# Patient Record
Sex: FEMALE | Race: WHITE | NOT HISPANIC OR LATINO | URBAN - METROPOLITAN AREA
[De-identification: names, ages, dates, MRNs, and addresses within clinical notes are randomized per-mention and may not be internally consistent; named-entity substitution may affect disease eponyms.]

---

## 2018-01-16 ENCOUNTER — APPOINTMENT (RX ONLY)
Dept: URBAN - METROPOLITAN AREA CLINIC 38 | Facility: CLINIC | Age: 33
Setting detail: DERMATOLOGY
End: 2018-01-16

## 2018-01-16 DIAGNOSIS — D22 MELANOCYTIC NEVI: ICD-10-CM

## 2018-01-16 DIAGNOSIS — L82.1 OTHER SEBORRHEIC KERATOSIS: ICD-10-CM

## 2018-01-16 PROBLEM — D22.5 MELANOCYTIC NEVI OF TRUNK: Status: ACTIVE | Noted: 2018-01-16

## 2018-01-16 PROCEDURE — ? OBSERVATION AND MEASURE

## 2018-01-16 PROCEDURE — 99213 OFFICE O/P EST LOW 20 MIN: CPT

## 2018-01-16 PROCEDURE — ? COUNSELING

## 2018-01-16 ASSESSMENT — LOCATION SIMPLE DESCRIPTION DERM
LOCATION SIMPLE: UPPER BACK
LOCATION SIMPLE: LEFT UPPER BACK
LOCATION SIMPLE: ABDOMEN

## 2018-01-16 ASSESSMENT — LOCATION DETAILED DESCRIPTION DERM
LOCATION DETAILED: LEFT MEDIAL UPPER BACK
LOCATION DETAILED: RIGHT LATERAL ABDOMEN
LOCATION DETAILED: INFERIOR THORACIC SPINE

## 2018-01-16 ASSESSMENT — LOCATION ZONE DERM: LOCATION ZONE: TRUNK

## 2019-02-27 ENCOUNTER — APPOINTMENT (RX ONLY)
Dept: URBAN - METROPOLITAN AREA CLINIC 38 | Facility: CLINIC | Age: 34
Setting detail: DERMATOLOGY
End: 2019-02-27

## 2019-02-27 DIAGNOSIS — D22 MELANOCYTIC NEVI: ICD-10-CM

## 2019-02-27 DIAGNOSIS — L53.8 OTHER SPECIFIED ERYTHEMATOUS CONDITIONS: ICD-10-CM

## 2019-02-27 PROBLEM — D22.5 MELANOCYTIC NEVI OF TRUNK: Status: ACTIVE | Noted: 2019-02-27

## 2019-02-27 PROCEDURE — ? PRODUCT LINE (ANTI-AGING)

## 2019-02-27 PROCEDURE — 99213 OFFICE O/P EST LOW 20 MIN: CPT

## 2019-02-27 PROCEDURE — ? OTHER

## 2019-02-27 PROCEDURE — ? COUNSELING

## 2019-02-27 ASSESSMENT — LOCATION SIMPLE DESCRIPTION DERM: LOCATION SIMPLE: UPPER BACK

## 2019-02-27 ASSESSMENT — LOCATION DETAILED DESCRIPTION DERM: LOCATION DETAILED: INFERIOR THORACIC SPINE

## 2019-02-27 ASSESSMENT — LOCATION ZONE DERM: LOCATION ZONE: TRUNK

## 2019-02-27 NOTE — PROCEDURE: PRODUCT LINE (ANTI-AGING)
Product 15 Price (In Dollars - Numeric Only, No Special Characters Or $): 67.50
Name Of Product 1: Clarifying Brightening Polish
Product 58 Units: 0
Product 7 Application Directions: Apply thin layer nightly
Send Charges To Patient Encounter: Yes
Product 18 Price (In Dollars - Numeric Only, No Special Characters Or $): 34
Product 47 Application Directions: With 4% hydroquinone
Product 19 Price (In Dollars - Numeric Only, No Special Characters Or $): 76.13
Product 46 Price (In Dollars - Numeric Only, No Special Characters Or $): 0.00
Product 43 Price (In Dollars - Numeric Only, No Special Characters Or $): 89
Name Of Product 44: Retinol pads 5x
Product 40 Price (In Dollars - Numeric Only, No Special Characters Or $): 72.50
Product 14 Price (In Dollars - Numeric Only, No Special Characters Or $): 109
Product 13 Price (In Dollars - Numeric Only, No Special Characters Or $): 24
Product 8 Application Directions: Apply every morning
Product 35 Price (In Dollars - Numeric Only, No Special Characters Or $): 27
Name Of Product 45: Hyaluron repair serum
Name Of Product 32: Retinol eye serum
Name Of Product 16: Tinted Physical SPF 50
Name Of Product 31: Antioxidant Gentle Cleanser
Name Of Product 43: Retinal 10x serum
Name Of Product 11: 5/2 pads
Product 33 Price (In Dollars - Numeric Only, No Special Characters Or $): 43.00
Name Of Product 8: Phloretin CF
Name Of Product 26: Physical matte spf
Product 21 Units: 1
Name Of Product 6: 2/2 cleanser
Name Of Product 14: Super Charged C serum
Product 8 Price (In Dollars - Numeric Only, No Special Characters Or $): 162
Name Of Product 25: Skinceuticals mask
Name Of Product 4: Remy Mint Mask
Product 17 Price (In Dollars - Numeric Only, No Special Characters Or $): 79.50
Name Of Product 7: Resveratrol BE
Product 32 Price (In Dollars - Numeric Only, No Special Characters Or $): 81.00
Name Of Product 20: Purifying cleanser
Name Of Product 47: Essential Brightening lotion
Name Of Product 41: Sheer physical spray
Product 1 Price (In Dollars - Numeric Only, No Special Characters Or $): 44
Product 16 Price (In Dollars - Numeric Only, No Special Characters Or $): 43
Name Of Product 49: Retinol pads 7.5
Name Of Product 10: Advanced Brightening Skin System
Name Of Product 34: Oil free sunscreen
Product 36 Price (In Dollars - Numeric Only, No Special Characters Or $): 84
Product 28 Application Directions: With 4%hydroquinone
Name Of Product 37: 2/2 foaming cleanser
Name Of Product 30: Ultra antioxidant spf 50
Product 3 Price (In Dollars - Numeric Only, No Special Characters Or $): 22.50
Product 22 Price (In Dollars - Numeric Only, No Special Characters Or $): 33.50
Name Of Product 50: Lip plumper
Name Of Product 22: AHA Face cream 10%
Product 31 Price (In Dollars - Numeric Only, No Special Characters Or $): 29.61
Product 42 Price (In Dollars - Numeric Only, No Special Characters Or $): 78
Product 1 Application Directions: Wash face twice daily
Product 38 Price (In Dollars - Numeric Only, No Special Characters Or $): 79
Name Of Product 36: Triple antioxidant cream
Product 29 Price (In Dollars - Numeric Only, No Special Characters Or $): 31
Name Of Product 46: Moisture balm
Name Of Product 38: Firming neck cream
Product 7 Price (In Dollars - Numeric Only, No Special Characters Or $): 152
Product 12 Price (In Dollars - Numeric Only, No Special Characters Or $): 160
Product 6 Price (In Dollars - Numeric Only, No Special Characters Or $): 23.85
Name Of Product 2: AHA Cleanser 10%
Product 39 Price (In Dollars - Numeric Only, No Special Characters Or $): 47.00
Product 10 Price (In Dollars - Numeric Only, No Special Characters Or $): 335
Product 34 Price (In Dollars - Numeric Only, No Special Characters Or $): 19.50
Name Of Product 35: 10/2 cleanser
Name Of Product 33: Antioxidant sheer physical SPF
Name Of Product 40: Retinal 7.5 pads
Name Of Product 17: Triple Antioxidant Serum
Product 47 Price (In Dollars - Numeric Only, No Special Characters Or $): 77
Name Of Product 13: Lite moisture cream
Product 11 Price (In Dollars - Numeric Only, No Special Characters Or $): 25
Name Of Product 29: AHA 10% pads
Name Of Product 5: Extremity cream 18%
Product 41 Price (In Dollars - Numeric Only, No Special Characters Or $): 41.22
Product 25 Price (In Dollars - Numeric Only, No Special Characters Or $): 50
Name Of Product 23: Clarifying cleanser
Product 4 Price (In Dollars - Numeric Only, No Special Characters Or $): 23
Name Of Product 42: Retinol serum 5x
Name Of Product 19: Retinol pads 7.5x
Product 45 Price (In Dollars - Numeric Only, No Special Characters Or $): 80
Product 21 Price (In Dollars - Numeric Only, No Special Characters Or $): 42
Name Of Product 39: AHA Body Lotion
Product 30 Price (In Dollars - Numeric Only, No Special Characters Or $): 39.50
Name Of Product 18: Sheer physical uv defense
Name Of Product 12: AGE Interrupter
Product 37 Price (In Dollars - Numeric Only, No Special Characters Or $): 33
Product 27 Price (In Dollars - Numeric Only, No Special Characters Or $): 32
Name Of Product 24: Advanced corrective cream
Name Of Product 3: 5/2 Therapeutic Cleanser
Detail Level: Zone
Name Of Product 27: Sheer physical r essentials
Name Of Product 21: Hyaluron-A Eye Repair serum
Product 28 Price (In Dollars - Numeric Only, No Special Characters Or $): 59.50
Name Of Product 9: Anti-Aging Skin System
Name Of Product 28: Brightening pads

## 2020-03-04 ENCOUNTER — APPOINTMENT (RX ONLY)
Dept: URBAN - METROPOLITAN AREA CLINIC 38 | Facility: CLINIC | Age: 35
Setting detail: DERMATOLOGY
End: 2020-03-04

## 2020-03-04 DIAGNOSIS — L82.1 OTHER SEBORRHEIC KERATOSIS: ICD-10-CM

## 2020-03-04 DIAGNOSIS — D22 MELANOCYTIC NEVI: ICD-10-CM

## 2020-03-04 PROBLEM — D22.5 MELANOCYTIC NEVI OF TRUNK: Status: ACTIVE | Noted: 2020-03-04

## 2020-03-04 PROCEDURE — 99213 OFFICE O/P EST LOW 20 MIN: CPT

## 2020-03-04 PROCEDURE — ? COUNSELING

## 2020-03-04 ASSESSMENT — LOCATION DETAILED DESCRIPTION DERM
LOCATION DETAILED: LEFT MID-UPPER BACK
LOCATION DETAILED: RIGHT SUPERIOR LATERAL MIDBACK
LOCATION DETAILED: PERIUMBILICAL SKIN

## 2020-03-04 ASSESSMENT — LOCATION SIMPLE DESCRIPTION DERM
LOCATION SIMPLE: RIGHT LOWER BACK
LOCATION SIMPLE: ABDOMEN
LOCATION SIMPLE: LEFT UPPER BACK

## 2020-03-04 ASSESSMENT — LOCATION ZONE DERM: LOCATION ZONE: TRUNK

## 2021-07-14 ENCOUNTER — APPOINTMENT (RX ONLY)
Dept: URBAN - METROPOLITAN AREA CLINIC 38 | Facility: CLINIC | Age: 36
Setting detail: DERMATOLOGY
End: 2021-07-14

## 2021-07-14 DIAGNOSIS — D22 MELANOCYTIC NEVI: ICD-10-CM

## 2021-07-14 DIAGNOSIS — L82.1 OTHER SEBORRHEIC KERATOSIS: ICD-10-CM

## 2021-07-14 PROBLEM — D22.5 MELANOCYTIC NEVI OF TRUNK: Status: ACTIVE | Noted: 2021-07-14

## 2021-07-14 PROBLEM — D22.72 MELANOCYTIC NEVI OF LEFT LOWER LIMB, INCLUDING HIP: Status: ACTIVE | Noted: 2021-07-14

## 2021-07-14 PROCEDURE — ? TREATMENT REGIMEN

## 2021-07-14 PROCEDURE — ? ADDITIONAL NOTES

## 2021-07-14 PROCEDURE — ? COUNSELING

## 2021-07-14 PROCEDURE — ? FULL BODY SKIN EXAM

## 2021-07-14 PROCEDURE — 99213 OFFICE O/P EST LOW 20 MIN: CPT

## 2021-07-14 ASSESSMENT — LOCATION DETAILED DESCRIPTION DERM
LOCATION DETAILED: LEFT SUPERIOR UPPER BACK
LOCATION DETAILED: RIGHT SUPERIOR MEDIAL MIDBACK
LOCATION DETAILED: LEFT MID-UPPER BACK
LOCATION DETAILED: LEFT DISTAL POSTERIOR THIGH

## 2021-07-14 ASSESSMENT — LOCATION ZONE DERM
LOCATION ZONE: LEG
LOCATION ZONE: TRUNK

## 2021-07-14 ASSESSMENT — LOCATION SIMPLE DESCRIPTION DERM
LOCATION SIMPLE: LEFT POSTERIOR THIGH
LOCATION SIMPLE: LEFT UPPER BACK
LOCATION SIMPLE: RIGHT LOWER BACK

## 2022-11-02 ENCOUNTER — APPOINTMENT (RX ONLY)
Dept: URBAN - METROPOLITAN AREA CLINIC 38 | Facility: CLINIC | Age: 37
Setting detail: DERMATOLOGY
End: 2022-11-02

## 2022-11-02 DIAGNOSIS — L82.1 OTHER SEBORRHEIC KERATOSIS: ICD-10-CM

## 2022-11-02 DIAGNOSIS — D22 MELANOCYTIC NEVI: ICD-10-CM

## 2022-11-02 PROBLEM — D22.72 MELANOCYTIC NEVI OF LEFT LOWER LIMB, INCLUDING HIP: Status: ACTIVE | Noted: 2022-11-02

## 2022-11-02 PROBLEM — D22.61 MELANOCYTIC NEVI OF RIGHT UPPER LIMB, INCLUDING SHOULDER: Status: ACTIVE | Noted: 2022-11-02

## 2022-11-02 PROBLEM — D22.5 MELANOCYTIC NEVI OF TRUNK: Status: ACTIVE | Noted: 2022-11-02

## 2022-11-02 PROCEDURE — ? COUNSELING

## 2022-11-02 PROCEDURE — 99213 OFFICE O/P EST LOW 20 MIN: CPT

## 2022-11-02 PROCEDURE — ? ADDITIONAL NOTES

## 2022-11-02 PROCEDURE — ? FULL BODY SKIN EXAM

## 2022-11-02 PROCEDURE — ? TREATMENT REGIMEN

## 2022-11-02 PROCEDURE — ? OTHER

## 2022-11-02 ASSESSMENT — LOCATION SIMPLE DESCRIPTION DERM
LOCATION SIMPLE: RIGHT FOREARM
LOCATION SIMPLE: ABDOMEN
LOCATION SIMPLE: LEFT POSTERIOR THIGH
LOCATION SIMPLE: UPPER BACK
LOCATION SIMPLE: RIGHT POSTERIOR THIGH
LOCATION SIMPLE: LEFT UPPER BACK

## 2022-11-02 ASSESSMENT — LOCATION DETAILED DESCRIPTION DERM
LOCATION DETAILED: LEFT LATERAL ABDOMEN
LOCATION DETAILED: INFERIOR THORACIC SPINE
LOCATION DETAILED: RIGHT DISTAL POSTERIOR THIGH
LOCATION DETAILED: LEFT DISTAL POSTERIOR THIGH
LOCATION DETAILED: RIGHT DISTAL RADIAL DORSAL FOREARM
LOCATION DETAILED: LEFT SUPERIOR UPPER BACK

## 2022-11-02 ASSESSMENT — LOCATION ZONE DERM
LOCATION ZONE: TRUNK
LOCATION ZONE: ARM
LOCATION ZONE: LEG

## 2022-11-02 NOTE — PROCEDURE: OTHER
Detail Level: Detailed
Render Risk Assessment In Note?: no
Other (Free Text): Phloretin CF qam qam\\nTriple antioxidant cream qhs
Note Text (......Xxx Chief Complaint.): This diagnosis correlates with the

## 2022-11-02 NOTE — PROCEDURE: MIPS QUALITY
Quality 431: Preventive Care And Screening: Unhealthy Alcohol Use - Screening: Patient screened for unhealthy alcohol use using a single question and scores less than 2 times per year
Quality 110: Preventive Care And Screening: Influenza Immunization: Influenza Immunization previously received during influenza season
Quality 226: Preventive Care And Screening: Tobacco Use: Screening And Cessation Intervention: Patient screened for tobacco use and is an ex/non-smoker
Detail Level: Detailed
Quality 130: Documentation Of Current Medications In The Medical Record: Current Medications Documented
Quality 431: Preventive Care And Screening: Unhealthy Alcohol Use - Screening: Patient not identified as an unhealthy alcohol user when screened for unhealthy alcohol use using a systematic screening method

## 2024-03-08 ENCOUNTER — APPOINTMENT (RX ONLY)
Dept: URBAN - METROPOLITAN AREA CLINIC 38 | Facility: CLINIC | Age: 39
Setting detail: DERMATOLOGY
End: 2024-03-08

## 2024-03-08 DIAGNOSIS — L98.8 OTHER SPECIFIED DISORDERS OF THE SKIN AND SUBCUTANEOUS TISSUE: ICD-10-CM

## 2024-03-08 DIAGNOSIS — D22 MELANOCYTIC NEVI: ICD-10-CM

## 2024-03-08 DIAGNOSIS — L82.1 OTHER SEBORRHEIC KERATOSIS: ICD-10-CM

## 2024-03-08 DIAGNOSIS — D18.0 HEMANGIOMA: ICD-10-CM

## 2024-03-08 PROBLEM — D22.62 MELANOCYTIC NEVI OF LEFT UPPER LIMB, INCLUDING SHOULDER: Status: ACTIVE | Noted: 2024-03-08

## 2024-03-08 PROBLEM — D22.71 MELANOCYTIC NEVI OF RIGHT LOWER LIMB, INCLUDING HIP: Status: ACTIVE | Noted: 2024-03-08

## 2024-03-08 PROBLEM — D22.5 MELANOCYTIC NEVI OF TRUNK: Status: ACTIVE | Noted: 2024-03-08

## 2024-03-08 PROBLEM — D18.01 HEMANGIOMA OF SKIN AND SUBCUTANEOUS TISSUE: Status: ACTIVE | Noted: 2024-03-08

## 2024-03-08 PROCEDURE — 99213 OFFICE O/P EST LOW 20 MIN: CPT

## 2024-03-08 PROCEDURE — ? COUNSELING

## 2024-03-08 PROCEDURE — ? TREATMENT REGIMEN

## 2024-03-08 PROCEDURE — ? FULL BODY SKIN EXAM

## 2024-03-08 ASSESSMENT — LOCATION DETAILED DESCRIPTION DERM
LOCATION DETAILED: LEFT SUPERIOR LATERAL UPPER BACK
LOCATION DETAILED: LEFT PROXIMAL DORSAL FOREARM
LOCATION DETAILED: EPIGASTRIC SKIN
LOCATION DETAILED: RIGHT INFERIOR UPPER BACK
LOCATION DETAILED: RIGHT LATERAL ABDOMEN
LOCATION DETAILED: RIGHT DISTAL POSTERIOR THIGH
LOCATION DETAILED: INFERIOR MID FOREHEAD
LOCATION DETAILED: LEFT ANTERIOR PROXIMAL THIGH

## 2024-03-08 ASSESSMENT — LOCATION SIMPLE DESCRIPTION DERM
LOCATION SIMPLE: RIGHT UPPER BACK
LOCATION SIMPLE: ABDOMEN
LOCATION SIMPLE: INFERIOR FOREHEAD
LOCATION SIMPLE: LEFT THIGH
LOCATION SIMPLE: LEFT UPPER BACK
LOCATION SIMPLE: RIGHT POSTERIOR THIGH
LOCATION SIMPLE: LEFT FOREARM

## 2024-03-08 ASSESSMENT — LOCATION ZONE DERM
LOCATION ZONE: ARM
LOCATION ZONE: TRUNK
LOCATION ZONE: LEG
LOCATION ZONE: FACE

## 2024-03-08 NOTE — PROCEDURE: TREATMENT REGIMEN
Detail Level: Detailed
Plan: SPF >30 daily
Plan: Skin better science alto advanced and alpha ret, trio discussed \\nElta uv physical

## 2024-03-08 NOTE — HPI: FREE FORM (INITIAL HISTORY)
How Severe Is Your Skin Condition? (The Patient Describes The Severity Level As....): mild
What Brings You In Today? (This Is An Xx Year Old Patient Who Presents For...): Anti-Aging product questions
When Did You First Notice It? (The Patient First Noticed It...): Years ago
Where On Your Body Is It? (Located On...): Face
Did Anything Happen To Make You Want To Come In For This Specifically Today? (The Specific Reason That The Patient Came In Today Was Because:): Patient would like advice on what products are best to use for anti-aging